# Patient Record
Sex: MALE | NOT HISPANIC OR LATINO | Employment: OTHER | URBAN - METROPOLITAN AREA
[De-identification: names, ages, dates, MRNs, and addresses within clinical notes are randomized per-mention and may not be internally consistent; named-entity substitution may affect disease eponyms.]

---

## 2018-02-05 ENCOUNTER — OFFICE VISIT (OUTPATIENT)
Dept: CARDIOLOGY CLINIC | Facility: CLINIC | Age: 58
End: 2018-02-05
Payer: COMMERCIAL

## 2018-02-05 VITALS
WEIGHT: 187 LBS | DIASTOLIC BLOOD PRESSURE: 80 MMHG | HEART RATE: 66 BPM | SYSTOLIC BLOOD PRESSURE: 126 MMHG | BODY MASS INDEX: 26.77 KG/M2 | HEIGHT: 70 IN

## 2018-02-05 DIAGNOSIS — I10 HYPERTENSION, UNSPECIFIED TYPE: Primary | ICD-10-CM

## 2018-02-05 PROBLEM — M19.90 OSTEOARTHRITIS: Status: ACTIVE | Noted: 2018-02-05

## 2018-02-05 PROCEDURE — 99202 OFFICE O/P NEW SF 15 MIN: CPT | Performed by: INTERNAL MEDICINE

## 2018-02-05 PROCEDURE — 93000 ELECTROCARDIOGRAM COMPLETE: CPT | Performed by: INTERNAL MEDICINE

## 2018-02-05 RX ORDER — ASPIRIN 81 MG/1
81 TABLET ORAL DAILY
COMMUNITY

## 2018-02-05 RX ORDER — IBUPROFEN 600 MG/1
TABLET ORAL EVERY 6 HOURS PRN
COMMUNITY

## 2018-02-05 RX ORDER — MULTIVITAMIN
1 CAPSULE ORAL DAILY
COMMUNITY

## 2018-02-05 NOTE — PROGRESS NOTES
Cardiology Follow Up    Sarah Newbytead  1960  [id number]  [address]  [address] [address]    1  Hypertension, unspecified type         Interval History: The patient is referred here by pain mgt for elevated BP  He denies cardiac symptoms including chest pain, SOB, edema, palpitations or lightheadedness  He has a lot of back pain and neuro symptoms in the LEs    Patient Active Problem List   Diagnosis    Osteoarthritis     No past medical history on file  Social History     Social History    Marital status: /Civil Union     Spouse name: N/A    Number of children: N/A    Years of education: N/A     Occupational History    Not on file  Social History Main Topics    Smoking status: Not on file    Smokeless tobacco: Not on file    Alcohol use Not on file    Drug use: Unknown    Sexual activity: Not on file     Other Topics Concern    Not on file     Social History Narrative    No narrative on file      No family history on file  No past surgical history on file  Current Outpatient Prescriptions:     aspirin (ECOTRIN LOW STRENGTH) 81 mg EC tablet, Take 81 mg by mouth daily, Disp: , Rfl:     ibuprofen (MOTRIN) 600 mg tablet, Take by mouth every 6 (six) hours as needed for mild pain, Disp: , Rfl:     Multiple Vitamin (MULTIVITAMIN) capsule, Take 1 capsule by mouth daily, Disp: , Rfl:   No Known Allergies      Review of Systems:  Review of Systems   Constitutional: Positive for fatigue  Negative for appetite change and fever  Respiratory: Negative for cough, shortness of breath and wheezing  Cardiovascular: Negative for chest pain, palpitations and leg swelling  Gastrointestinal: Negative for constipation and diarrhea  Genitourinary: Negative for frequency  Musculoskeletal: Positive for back pain  Neurological: Positive for numbness         Physical Exam:  Vitals:    02/05/18 1102 02/05/18 1118   BP: 130/90    BP Location: Right arm    Patient Position: Sitting    Cuff Size: Adult    Pulse:  66   Weight: 84 8 kg (187 lb)    Height: 5' 10" (1 778 m)      /80 right   132/80 left by me    Physical Exam   Constitutional: He appears well-developed and well-nourished  No distress  HENT:   Head: Normocephalic and atraumatic  Mouth/Throat: No oropharyngeal exudate  Eyes: Conjunctivae are normal  No scleral icterus  Neck: Neck supple  Normal carotid pulses and no JVD present  Carotid bruit is not present  No thyromegaly present  Cardiovascular: Normal rate, normal heart sounds and intact distal pulses  Exam reveals no gallop and no friction rub  No murmur heard  Pulmonary/Chest: Breath sounds normal  He has no wheezes  He has no rhonchi  He has no rales  Abdominal: There is no hepatosplenomegaly  There is no tenderness  There is no guarding  Musculoskeletal: He exhibits no edema  Discussion/Summary:  1  Episodic hypertension  Blood pressure readings have exceeded parameters where I would treat however he is having a fair amount of back pain  His blood pressure today however was acceptable without therapy  Did review non pharmacologic measures  Would like to see patient check his blood pressure 1-2 times per week and call if he has even an occasional reading that exceeds 013 systolic or 90 diastolic  He has no cardiac symptomatology and a normal cardiac exam   Likewise his EKG is normal   2   with HDL of 79-risk of MI in 10 years about 6%      No indication for statin at this time       FU PRN    Will call if BP elevations occur      Maggi Brannon MD

## 2023-07-17 ENCOUNTER — TELEPHONE (OUTPATIENT)
Dept: NEUROSURGERY | Facility: CLINIC | Age: 63
End: 2023-07-17

## 2023-07-17 NOTE — TELEPHONE ENCOUNTER
7/17/23 PT CALLED NEWPT LINE AND LM FOR APPT. PT STATES HE HAS NEW INS [facility] MEDICARE ADVANTAGE (IF [place] NEEDS TO BE PASSPORT PER [place]), NEEDS TO BE VERIFIED BEFORE ENTERING SELF REFERRAL AND INTAKE. CALLED PT AND LMOM FOR HIM TO CB TO DISCUSS.

## 2023-07-18 NOTE — TELEPHONE ENCOUNTER
7/18/23 PT WIFE, CARRINGTON CALLED AND LM ON NEWPT LINE 1213PM. CALLED PT WIFE ON CELL, SHE WILL EITHER BRING REPORTS OF RECENT IMAGING INTO OFFICE TO BE COPIED OR SHE WILL HAVE THEM FAXED. PT HAD RECENT MRI BRAIN, FEL[facility], [address] [facility] DONE IN [address] [facility] REQUESTING APPT FOR CSPINE. INS: [facility] MEDICARE ADVANTAGE PPO   IA#[id number]  PROVIDERS #[phone]  ADVISED PT OUR OFFICE WOULD CALL BACK FOR SELF REFERRAL TO BE ENTERED AND CSPINE INTAKE TO BE COMPLETED ONCE REPORTS IN CHART.

## 2023-07-25 ENCOUNTER — CONSULT (OUTPATIENT)
Dept: NEUROSURGERY | Facility: CLINIC | Age: 63
End: 2023-07-25
Payer: COMMERCIAL

## 2023-07-25 ENCOUNTER — TELEPHONE (OUTPATIENT)
Dept: NEUROSURGERY | Facility: CLINIC | Age: 63
End: 2023-07-25

## 2023-07-25 ENCOUNTER — HOSPITAL ENCOUNTER (OUTPATIENT)
Dept: RADIOLOGY | Facility: HOSPITAL | Age: 63
Discharge: HOME | End: 2023-07-25
Payer: COMMERCIAL

## 2023-07-25 VITALS
TEMPERATURE: 98.4 F | OXYGEN SATURATION: 98 % | BODY MASS INDEX: 25.43 KG/M2 | DIASTOLIC BLOOD PRESSURE: 82 MMHG | HEART RATE: 76 BPM | WEIGHT: 177.6 LBS | HEIGHT: 70 IN | RESPIRATION RATE: 18 BRPM | SYSTOLIC BLOOD PRESSURE: 124 MMHG

## 2023-07-25 DIAGNOSIS — M54.2 CERVICALGIA: ICD-10-CM

## 2023-07-25 DIAGNOSIS — M54.2 CERVICALGIA: Primary | ICD-10-CM

## 2023-07-25 PROCEDURE — 72052 X-RAY EXAM NECK SPINE 6/>VWS: CPT

## 2023-07-25 PROCEDURE — 99204 OFFICE O/P NEW MOD 45 MIN: CPT | Performed by: PHYSICIAN ASSISTANT

## 2023-07-25 RX ORDER — ATORVASTATIN CALCIUM 20 MG/1
20 TABLET, FILM COATED ORAL DAILY
COMMUNITY
Start: 2023-06-12

## 2023-07-25 RX ORDER — HYDROCORTISONE 25 MG/G
CREAM TOPICAL
COMMUNITY
Start: 2023-04-21

## 2023-07-25 RX ORDER — FLUTICASONE PROPIONATE 50 MCG
SPRAY, SUSPENSION (ML) NASAL
COMMUNITY
Start: 2023-06-02

## 2023-07-25 RX ORDER — AMLODIPINE BESYLATE 5 MG/1
5 TABLET ORAL DAILY
COMMUNITY
Start: 2023-06-12

## 2023-07-25 RX ORDER — TAMSULOSIN HYDROCHLORIDE 0.4 MG/1
CAPSULE ORAL
COMMUNITY
Start: 2023-06-28

## 2023-07-25 NOTE — PROGRESS NOTES
Neurosurgery Office Note  Pamella Lopes 61 y.o. male MRN: [id number]      Assessment/Plan     Cervicalgia  · Pt presents with complaint of increased neck pain of the last 6 months. · Imaging reviewed personally and by attending. Final results below discussed with the patient. · MRI Cervical spine wo 6/21/23 (outside imaging): Multilevel degenerative changes in the cervical spine with mild disc herniations C3-C7. With mild to moderate left C3-4, C4-C5 foraminal narrowing, ordered bilateral foraminal narrowing at C5-C6. Left moderate to severe foraminal narrowing at left C6-7. Plan  · Pain control with over-the-counter and prescribed medications as needed. · Recommend consideration for seeing pain management service and physical therapy. Pt deferred referrals at this time. · If symptoms persist despite conservative measures, patient to follow-up with neurosurgery with flexion-extension x-rays of the cervical spine for further evaluation (joint visit with AP and Dr. Simeon Merlos- pt specifically requested a visit with Dr. Simeon Merlos). · Discussed plan of care with patient who showed understanding. · Patient made aware to contact neurosurgery with any questions or concerns. I have spent a total time of 45 minutes on 07/25/23 in caring for this patient including Diagnostic results, Risks and benefits of tx options, Instructions for management, Patient and family education, Impressions, Counseling / Coordination of care, Documenting in the medical record, Reviewing / ordering tests, medicine, procedures  , Obtaining or reviewing history   and Communicating with other healthcare professionals . CHIEF COMPLAINT    Chief Complaint   Patient presents with   • Consult     NP NECK PAIN        HISTORY    History of Present Illness     61y.o. year old male     Patient is a 29-year-old male with past medical of laminectomy L4-5 March 2016 in [place]. He presents today for follow up for his neck pain.  Pt reports that He reports he had been having spine pain for 8 years. He reports he had work accident in 2014 and since then he had had neck and back pain. He reports in the last 6 months he had developed increased neck pain. Reports his neck pain ranges 3-5/10 on the pain scale. He reports occasional tingling in RUE > LUE. Reports that at times he gets muscle spasms and pain from the base of his skull to his tailbone. Reports occasionally dropping things with his hands. He reports occasional tingling in the right hand from elbow to his hand and RLE tingling laterally to his foot. He reports chronic right foot numbness. Pt reports that after his lumbar laminectomy he had improved in right sided radicular sx but had residual right foot numbness. He reports at times has difficulty walking and his right foot gets clumsy. He denies darline or bladder issues. No recent PT or pain mgt. Pt has been taking medical marijuana. Occasionally take Tylenol. Pt accompanied by his wife to this visit. REVIEW OF SYSTEMS    Review of Systems   Musculoskeletal: Positive for gait problem and neck pain. NP NECK PAIN 5/26/23 MRI [facility] [place] - [facility] - [facility]  H/O BACK SURGERY- lamincomenty L4-5     cc neck pain w b/l tingling b/l arms and more right ( occasionally) weakness overall ,, HA and difficult walk x 8 yrs- neck pain getting worse in the past 6mon  - pt c/o discomfort entire back to base of skull and tailbone     meds motrin PRN     no recent Physical therapy or PM/ANTOINETTE    Neurological: Positive for tremors (right side ), weakness, numbness and headaches. Psychiatric/Behavioral: Positive for sleep disturbance. All other systems reviewed and are negative. ROS obtained by MA. Reviewed. See HPI.      Meds/Allergies     Current Outpatient Medications   Medication Sig Dispense Refill   • amLODIPine (NORVASC) 5 mg tablet Take 5 mg by mouth daily     • atorvastatin (LIPITOR) 20 mg tablet Take 20 mg by mouth daily     • fluticasone (FLONASE) 50 mcg/act nasal spray SPRAY 2 SPRAYS INTO EACH NOSTRIL EVERY DAY     • hydrocortisone (ANUSOL-HC) 2.5 % rectal cream APPLY A THIN LAYER TO AFFECTED AREA(S) 2 TO 4 TIMES DAILY     • ibuprofen (MOTRIN) 600 mg tablet Take by mouth every 6 (six) hours as needed for mild pain     • Multiple Vitamin (MULTIVITAMIN) capsule Take 1 capsule by mouth daily     • tamsulosin (FLOMAX) 0.4 mg As needed     • aspirin (ECOTRIN LOW STRENGTH) 81 mg EC tablet Take 81 mg by mouth daily (Patient not taking: Reported on 7/25/2023)       No current facility-administered medications for this visit. No Known Allergies    PAST HISTORY    Past Medical History:   Diagnosis Date   • Osteoarthritis        Past Surgical History:   Procedure Laterality Date   • BACK SURGERY         Social History     Tobacco Use   • Smoking status: Never   • Smokeless tobacco: Never   Substance Use Topics   • Alcohol use: Yes     Comment: 3 drinks per day   • Drug use: No       Family History   Problem Relation Age of Onset   • Heart attack Neg Hx    • Heart disease Neg Hx    • Hypertension Neg Hx          Above history personally reviewed. EXAM    Vitals:Blood pressure 124/82, pulse 76, temperature 98.4 °F (36.9 °C), resp. rate 18, height 5' 10" (1.778 m), weight 80.6 kg (177 lb 9.6 oz), SpO2 98 %. ,Body mass index is 25.48 kg/m². Physical Exam  Constitutional:       Appearance: He is well-developed. HENT:      Head: Normocephalic and atraumatic. Eyes:      General: No scleral icterus. Conjunctiva/sclera: Conjunctivae normal.      Pupils: Pupils are equal, round, and reactive to light. Neck:      Trachea: No tracheal deviation. Cardiovascular:      Rate and Rhythm: Normal rate. Pulmonary:      Effort: Pulmonary effort is normal.   Abdominal:      Palpations: Abdomen is soft. Tenderness: There is no abdominal tenderness. There is no guarding. Musculoskeletal:         General: No tenderness. Cervical back: Normal range of motion and neck supple. Comments: No TTP of the C/T/L spine. Skin:     General: Skin is warm and dry. Coloration: Skin is not pale. Findings: No rash. Neurological:      Mental Status: He is alert and oriented to person, place, and time. Comments: GCS 15, Awake, Alert, Oriented x 3    Motor: CONNELL, strength 5/5 throughout    Sensation:  intact to LT/PP X 4 except hypersensitivity on the RUE laterally compared to left. Reflexes: 2+ and symmetric BUE, 1+ BLE, no owusu's or clonus     Coordination: no drift bilateral upper extremities   Psychiatric:         Behavior: Behavior normal.         Neurologic Exam     Mental Status   Oriented to person, place, and time. Cranial Nerves     CN III, IV, VI   Pupils are equal, round, and reactive to light. MEDICAL DECISION MAKING    Imaging Studies:     I have personally reviewed pertinent reports.    and I have personally reviewed pertinent films in PACS

## 2023-07-25 NOTE — ASSESSMENT & PLAN NOTE
· Pt presents with complaint of increased neck pain of the last 6 months. · Imaging reviewed personally and by attending. Final results below discussed with the patient. · MRI Cervical spine wo 6/21/23 (outside imaging): Multilevel degenerative changes in the cervical spine with mild disc herniations C3-C7. With mild to moderate left C3-4, C4-C5 foraminal narrowing, ordered bilateral foraminal narrowing at C5-C6. Left moderate to severe foraminal narrowing at left C6-7. Plan  · Pain control with over-the-counter and prescribed medications as needed. · Recommend consideration for seeing pain management service and physical therapy. Pt deferred referrals at this time. · If symptoms persist despite conservative measures, patient to follow-up with neurosurgery with flexion-extension x-rays of the cervical spine for further evaluation (joint visit with AP and Dr. Darline Willams- pt specifically requested a visit with Dr. Darline Willams). · Discussed plan of care with patient who showed understanding. · Patient made aware to contact neurosurgery with any questions or concerns.

## 2023-09-06 NOTE — ASSESSMENT & PLAN NOTE
Returns for follow up of neck pain for the last 6 months. · Mostly right sided neck pain described as an annoyance. No pain, numbness, tingling radiating down his arms, no issues with fine motor skills. No BBI. Ambulates independently. Feels overall weak. · Also complains of low back pain rating down the lateral thigh and calf to dorsum of foot. · Exam: No midline neck or back TTP, right-sided trapezius stiffness with ROM, 5/5 throughout, LT intact, 2+ DTRs, no Palmer's or clonus, normal finger-to-nose with no drift, intact rapid finger movements    Imaging:  · XR cervical 7/25/23: No acute osseous abnormality of cervical spine. Degenerative changes, as detailed above  · MRI Cervical spine wo 6/21/23 (outside imaging): Multilevel degenerative changes in the cervical spine with mild disc herniations C3-C7. With mild to moderate left C3-4, C4-C5 foraminal narrowing, ordered bilateral foraminal narrowing at C5-C6. Left moderate to severe foraminal narrowing at left C6-7. Plan  · Reviewed MRI cervical spine, XR cervical spine, MRI lumbar spine with patient, wife as well as Dr. Abbey Molina. He has no significant canal stenosis in his neck, though he does have some foraminal stenosis. He is not noting any radicular complaints or myelopathy. · No neurosurgical invention recommended. · Recommend PT for overall strengthening. Referral placed. · In terms of his low back, prior laminectomy noted on the right at L4-5, with some suggestion of left foraminal stenosis at this level. He has no left lower extremity complaints, so continue to manage conservatively. · Follow-up as needed. Call with any questions or concerns.

## 2023-09-07 ENCOUNTER — OFFICE VISIT (OUTPATIENT)
Dept: NEUROSURGERY | Facility: CLINIC | Age: 63
End: 2023-09-07
Payer: COMMERCIAL

## 2023-09-07 VITALS
SYSTOLIC BLOOD PRESSURE: 130 MMHG | HEART RATE: 86 BPM | DIASTOLIC BLOOD PRESSURE: 70 MMHG | BODY MASS INDEX: 25.34 KG/M2 | OXYGEN SATURATION: 99 % | TEMPERATURE: 98.1 F | WEIGHT: 177 LBS | HEIGHT: 70 IN

## 2023-09-07 DIAGNOSIS — M54.2 CERVICALGIA: Primary | ICD-10-CM

## 2023-09-07 PROCEDURE — 99214 OFFICE O/P EST MOD 30 MIN: CPT | Performed by: STUDENT IN AN ORGANIZED HEALTH CARE EDUCATION/TRAINING PROGRAM

## 2023-09-07 NOTE — PROGRESS NOTES
Neurosurgery Office Note  Glory Gaming 61 y.o. male MRN: [id number]      Assessment/Plan     Cervicalgia  Returns for follow up of neck pain for the last 6 months. · Mostly right sided neck pain described as an annoyance. No pain, numbness, tingling radiating down his arms, no issues with fine motor skills. No BBI. Ambulates independently. Feels overall weak. · Also complains of low back pain rating down the lateral thigh and calf to dorsum of foot. · Exam: No midline neck or back TTP, right-sided trapezius stiffness with ROM, 5/5 throughout, LT intact, 2+ DTRs, no Palmer's or clonus, normal finger-to-nose with no drift, intact rapid finger movements    Imaging:  · XR cervical 7/25/23: No acute osseous abnormality of cervical spine. Degenerative changes, as detailed above  · MRI Cervical spine wo 6/21/23 (outside imaging): Multilevel degenerative changes in the cervical spine with mild disc herniations C3-C7. With mild to moderate left C3-4, C4-C5 foraminal narrowing, ordered bilateral foraminal narrowing at C5-C6. Left moderate to severe foraminal narrowing at left C6-7. Plan  · Reviewed MRI cervical spine, XR cervical spine, MRI lumbar spine with patient, wife as well as Dr. Lady Moses. He has no significant canal stenosis in his neck, though he does have some foraminal stenosis. He is not noting any radicular complaints or myelopathy. · No neurosurgical invention recommended. · Recommend PT for overall strengthening. Referral placed. · In terms of his low back, prior laminectomy noted on the right at L4-5, with some suggestion of left foraminal stenosis at this level. He has no left lower extremity complaints, so continue to manage conservatively. · Follow-up as needed. Call with any questions or concerns. Diagnoses and all orders for this visit:    Cervicalgia  -     Ambulatory referral to Physical Therapy;  Future          I have spent a total time of 45 minutes on 09/07/23 in caring for this patient including Diagnostic results, Instructions for management, Patient and family education, Impressions, Counseling / Coordination of care, Documenting in the medical record, Reviewing / ordering tests, medicine, procedures  , Obtaining or reviewing history   and Communicating with other healthcare professionals . CHIEF COMPLAINT    Chief Complaint   Patient presents with   • Follow-up     Last updated 7/25/2023 by Austin Gray NP NECK PAIN 5/26/23 MRI [address] Last updated 7/25/2023 by Austin Gray NP NECK PAIN 5/26/23 MRI CSPINE - [address] [facility]                HISTORY    History of Present Illness     61y.o. year old male     61year old gentleman seen for new evaluation of neck pain x 6 months. He describes it as a constant annoyance and rates pain 2/10. Notes limited ROM and stiffness, especially on the right. It does go into the shoulders and sometimes has tingling occasionally. No pain, numbness, tingling into upper extremities. Right hand dominant. No issues with fine motor skills. No BBI. Notes whole body weakness and hasn't been as active in the last year. He has some difficulty walking due to the right leg fatigue. He ambulates independently. Also mentions having whole back pain. Hx of laminectomy at L4-5 in 2016. There is pain across the low back radiating into the right lateral thigh and calf to dorsum of foot. He has a right foot drop prior, since surgery is only slight. Patient has been taking medical marijuana, occasionallyTylenol. He has not seen PT yet or pain management for injections. See Discussion    REVIEW OF SYSTEMS    Review of Systems   Musculoskeletal: Positive for gait problem and neck pain (crunching for the longest time, right side is more restricted. his pature with the shoulder  is leaning more to the lef. occasionally pain on Bi shoulders). Neck stiffness: Bi stifftnes more on the right. NP NECK PAIN 5/26/23 MRI [facility] - [facility] - [facility]  H/O BACK SURGERY- lamincomenty L4-5     cc neck pain w b/l tingling b/l arms and more right ( occasionally) weakness overall ,, HA and difficult walk x 8 yrs- neck pain getting worse in the past 6mon  - pt c/o discomfort entire back to base of skull and tailbone     meds motrin PRN     no recent Physical therapy or PM/ANTOINETTE    Neurological: Positive for dizziness (at times and it effect his vision he think) and weakness (overall he feels his body have been decline. ). Negative for numbness and headaches. Tremors: right side and back. Psychiatric/Behavioral: Positive for sleep disturbance (due to pain). All other systems reviewed and are negative. ROS obtained by MA. Reviewed. See HPI. Meds/Allergies     Current Outpatient Medications   Medication Sig Dispense Refill   • atorvastatin (LIPITOR) 20 mg tablet Take 20 mg by mouth daily     • fluticasone (FLONASE) 50 mcg/act nasal spray SPRAY 2 SPRAYS INTO EACH NOSTRIL EVERY DAY     • ibuprofen (MOTRIN) 600 mg tablet Take by mouth every 6 (six) hours as needed for mild pain     • Multiple Vitamin (MULTIVITAMIN) capsule Take 1 capsule by mouth daily     • tamsulosin (FLOMAX) 0.4 mg As needed     • amLODIPine (NORVASC) 5 mg tablet Take 5 mg by mouth daily (Patient not taking: Reported on 9/7/2023)       No current facility-administered medications for this visit. No Known Allergies    PAST HISTORY    Past Medical History:   Diagnosis Date   • Osteoarthritis        Past Surgical History:   Procedure Laterality Date   • BACK SURGERY         Social History     Tobacco Use   • Smoking status: Never   • Smokeless tobacco: Never   Substance Use Topics   • Alcohol use: Yes     Comment: 3 drinks per day   • Drug use: No       Family History   Problem Relation Age of Onset   • Heart attack Neg Hx    • Heart disease Neg Hx    • Hypertension Neg Hx          Above history personally reviewed. EXAM    Vitals:Blood pressure 130/70, pulse 86, temperature 98.1 °F (36.7 °C), temperature source Temporal, height 5' 10" (1.778 m), weight 80.3 kg (177 lb), SpO2 99 %. ,Body mass index is 25.4 kg/m². Physical Exam  Vitals reviewed. Constitutional:       General: He is awake. Appearance: Normal appearance. HENT:      Head: Normocephalic and atraumatic. Eyes:      Conjunctiva/sclera: Conjunctivae normal.   Neck:      Comments: No midline neck TTP  Right sided trapezius pain/stiffness with ROM  Cardiovascular:      Rate and Rhythm: Normal rate. Pulmonary:      Effort: Pulmonary effort is normal.   Skin:     General: Skin is warm and dry. Neurological:      Mental Status: He is alert and oriented to person, place, and time. Motor: Motor strength is normal.     Coordination: Finger-Nose-Finger Test normal.      Gait: Gait is intact. Deep Tendon Reflexes:      Reflex Scores:       Bicep reflexes are 2+ on the right side and 2+ on the left side. Brachioradialis reflexes are 2+ on the right side and 2+ on the left side. Patellar reflexes are 2+ on the right side and 2+ on the left side. Achilles reflexes are 2+ on the right side and 2+ on the left side. Psychiatric:         Attention and Perception: Attention and perception normal.         Mood and Affect: Mood and affect normal.         Speech: Speech normal.         Behavior: Behavior normal. Behavior is cooperative. Thought Content: Thought content normal.         Cognition and Memory: Cognition and memory normal.         Judgment: Judgment normal.         Neurologic Exam     Mental Status   Oriented to person, place, and time. Follows 2 step commands. Attention: normal. Concentration: normal.   Speech: speech is normal   Level of consciousness: alert  Knowledge: good. Normal comprehension.      Cranial Nerves     CN XI   Right trapezius strength: normal  Left trapezius strength: normal    Motor Exam Muscle bulk: normal  Overall muscle tone: normal  Right arm pronator drift: absent  Left arm pronator drift: absent    Strength   Strength 5/5 throughout. Sensory Exam   Right arm light touch: normal  Left arm light touch: normal  Right leg light touch: normal  Left leg light touch: normal    Gait, Coordination, and Reflexes     Gait  Gait: normal    Coordination   Finger to nose coordination: normal    Tremor   Resting tremor: absent  Intention tremor: absent  Action tremor: absent    Reflexes   Right brachioradialis: 2+  Left brachioradialis: 2+  Right biceps: 2+  Left biceps: 2+  Right patellar: 2+  Left patellar: 2+  Right achilles: 2+  Left achilles: 2+  Right Palmer: absent  Left Palmer: absent  Right ankle clonus: absent  Left ankle clonus: absent  Intact rapid finger movements         MEDICAL DECISION MAKING    Imaging Studies:     No results found. I have personally reviewed pertinent reports.    and I have personally reviewed pertinent films in PACS

## 2024-12-06 ENCOUNTER — NEW REFERRAL (OUTPATIENT)
Dept: URBAN - METROPOLITAN AREA CLINIC 52 | Facility: CLINIC | Age: 64
End: 2024-12-06

## 2024-12-06 VITALS — SYSTOLIC BLOOD PRESSURE: 135 MMHG | DIASTOLIC BLOOD PRESSURE: 95 MMHG | HEART RATE: 84 BPM

## 2024-12-06 DIAGNOSIS — H25.13: ICD-10-CM

## 2024-12-06 DIAGNOSIS — H35.412: ICD-10-CM

## 2024-12-06 DIAGNOSIS — H43.823: ICD-10-CM

## 2024-12-06 PROCEDURE — 92201 OPSCPY EXTND RTA DRAW UNI/BI: CPT

## 2024-12-06 PROCEDURE — 92134 CPTRZ OPH DX IMG PST SGM RTA: CPT

## 2024-12-06 PROCEDURE — 99204 OFFICE O/P NEW MOD 45 MIN: CPT

## 2024-12-06 ASSESSMENT — VISUAL ACUITY
OD_SC: 20/20
OD_SC: 20/80
OS_SC: 20/200
OS_SC: 20/50+

## 2024-12-06 ASSESSMENT — TONOMETRY
OS_IOP_MMHG: 11
OD_IOP_MMHG: 10

## 2025-01-31 ENCOUNTER — FOLLOW UP (OUTPATIENT)
Dept: URBAN - METROPOLITAN AREA CLINIC 52 | Facility: CLINIC | Age: 65
End: 2025-01-31

## 2025-01-31 DIAGNOSIS — H43.823: ICD-10-CM

## 2025-01-31 DIAGNOSIS — H35.412: ICD-10-CM

## 2025-01-31 DIAGNOSIS — H25.13: ICD-10-CM

## 2025-01-31 PROCEDURE — 92134 CPTRZ OPH DX IMG PST SGM RTA: CPT

## 2025-01-31 PROCEDURE — 92201 OPSCPY EXTND RTA DRAW UNI/BI: CPT

## 2025-01-31 PROCEDURE — 92014 COMPRE OPH EXAM EST PT 1/>: CPT

## 2025-01-31 ASSESSMENT — TONOMETRY
OS_IOP_MMHG: 10
OD_IOP_MMHG: 10

## 2025-01-31 ASSESSMENT — VISUAL ACUITY
OS_SC: 20/40+2
OD_SC: 20/25+1

## 2025-05-09 ENCOUNTER — FOLLOW UP (OUTPATIENT)
Dept: URBAN - METROPOLITAN AREA CLINIC 52 | Age: 65
End: 2025-05-09

## 2025-05-09 DIAGNOSIS — H35.412: ICD-10-CM

## 2025-05-09 DIAGNOSIS — H43.823: ICD-10-CM

## 2025-05-09 DIAGNOSIS — H25.13: ICD-10-CM

## 2025-05-09 PROCEDURE — 92134 CPTRZ OPH DX IMG PST SGM RTA: CPT

## 2025-05-09 PROCEDURE — 92201 OPSCPY EXTND RTA DRAW UNI/BI: CPT

## 2025-05-09 PROCEDURE — 92012 INTRM OPH EXAM EST PATIENT: CPT

## 2025-05-09 ASSESSMENT — TONOMETRY
OD_IOP_MMHG: 14
OS_IOP_MMHG: 10

## 2025-05-09 ASSESSMENT — VISUAL ACUITY
OS_SC: 20/30
OD_PH: 20/20-2
OD_SC: 20/25+1

## 2025-08-08 ENCOUNTER — FOLLOW UP (OUTPATIENT)
Dept: URBAN - METROPOLITAN AREA CLINIC 52 | Age: 65
End: 2025-08-08

## 2025-08-08 DIAGNOSIS — H43.812: ICD-10-CM

## 2025-08-08 DIAGNOSIS — H35.412: ICD-10-CM

## 2025-08-08 DIAGNOSIS — H43.823: ICD-10-CM

## 2025-08-08 DIAGNOSIS — H25.13: ICD-10-CM

## 2025-08-08 PROCEDURE — 92012 INTRM OPH EXAM EST PATIENT: CPT

## 2025-08-08 PROCEDURE — 92134 CPTRZ OPH DX IMG PST SGM RTA: CPT

## 2025-08-08 PROCEDURE — 92201 OPSCPY EXTND RTA DRAW UNI/BI: CPT

## 2025-08-08 ASSESSMENT — TONOMETRY
OD_IOP_MMHG: 12
OS_IOP_MMHG: 12

## 2025-08-08 ASSESSMENT — VISUAL ACUITY
OD_PH: 20/20-2
OD_SC: 20/25
OS_SC: 20/25-2